# Patient Record
Sex: MALE | NOT HISPANIC OR LATINO | URBAN - METROPOLITAN AREA
[De-identification: names, ages, dates, MRNs, and addresses within clinical notes are randomized per-mention and may not be internally consistent; named-entity substitution may affect disease eponyms.]

---

## 2019-01-14 ENCOUNTER — OFFICE VISIT (OUTPATIENT)
Dept: URBAN - METROPOLITAN AREA CLINIC 33 | Facility: CLINIC | Age: 64
End: 2019-01-14
Payer: COMMERCIAL

## 2019-01-14 PROCEDURE — 92134 CPTRZ OPH DX IMG PST SGM RTA: CPT | Performed by: OPHTHALMOLOGY

## 2019-01-14 PROCEDURE — 99213 OFFICE O/P EST LOW 20 MIN: CPT | Performed by: OPHTHALMOLOGY

## 2019-01-14 ASSESSMENT — INTRAOCULAR PRESSURE
OD: 18
OS: 17

## 2019-01-14 NOTE — IMPRESSION/PLAN
Impression: Type 2 diab w severe nonprlf diabetic rtnop w macular edema, bilateral: e11.3413. OU. Condition: unstable. Vision: vision affected. S/P AV OU 5/31/18
s/p AV OS 06/19/2017 and previously AV OU 07/28/2016
s/p Map OD 09/14/2016, s/p Map OS 09/28/2016. Plan: Discussed diagnosis in detail with patient. Discussed risks of progression. Recommend Focal Laser treatment, MAP OD then MAP OS to help reduce the swelling and prevent a further reduction in vision. Discussed risks/benefits of laser TX. All questions answered. OCT performed today: slight decreased CMT OU Patient understands that additional KENNETH treatments or laser treatments may be needed in the future.

## 2019-01-14 NOTE — IMPRESSION/PLAN
Impression: Age-related nuclear cataract, bilateral: H25.13. OU. Condition: stable. Vision: vision affected. Plan: Cataracts account for the patient's complaints. No treatment currently recommended. The patient will monitor vision changes and contact us with any decrease in vision.

## 2019-01-30 ENCOUNTER — SURGERY (OUTPATIENT)
Dept: URBAN - METROPOLITAN AREA SURGERY 15 | Facility: SURGERY | Age: 64
End: 2019-01-30
Payer: COMMERCIAL

## 2019-01-30 PROCEDURE — 67210 TREATMENT OF RETINAL LESION: CPT | Performed by: OPHTHALMOLOGY

## 2019-02-13 ENCOUNTER — SURGERY (OUTPATIENT)
Dept: URBAN - METROPOLITAN AREA SURGERY 15 | Facility: SURGERY | Age: 64
End: 2019-02-13
Payer: COMMERCIAL

## 2019-02-13 PROCEDURE — 67210 TREATMENT OF RETINAL LESION: CPT | Performed by: OPHTHALMOLOGY

## 2019-03-07 ENCOUNTER — OFFICE VISIT (OUTPATIENT)
Dept: URBAN - METROPOLITAN AREA CLINIC 33 | Facility: CLINIC | Age: 64
End: 2019-03-07
Payer: COMMERCIAL

## 2019-03-07 PROCEDURE — 99213 OFFICE O/P EST LOW 20 MIN: CPT | Performed by: OPTOMETRIST

## 2019-03-07 PROCEDURE — 92134 CPTRZ OPH DX IMG PST SGM RTA: CPT | Performed by: OPTOMETRIST

## 2019-03-07 ASSESSMENT — KERATOMETRY
OD: 45.63
OS: 45.13

## 2019-03-07 ASSESSMENT — INTRAOCULAR PRESSURE
OS: 16
OD: 16

## 2019-03-07 NOTE — IMPRESSION/PLAN
Impression: Type 2 diab w moderate nonprlf diab rtnop w macular edema, bilateral: E21.9330. Plan: Diabetes type II: moderate nonproliferative diabetic retinopathy, no signs of neovascularization noted. No treatment necessary at this time. Patient was instructed to monitor vision for sudden changes and to call if visual changes occur. Discussed ocular and systemic benefits of maintaining blood sugar control. Letter sent to communicate findings to PCP. Clinically Signficant Macular Edema noted today. Educated patient about today's findings and the need for treatment. Will refer patient for a retinal consult to determine treatment plan. Discussed ocular and systemic benefits of maintaining blood sugar control. Patient understands the potential loss of visual acuity from diabetic macular edema. Order OCT-mac OU.

## 2019-03-21 ENCOUNTER — POST-OPERATIVE VISIT (OUTPATIENT)
Dept: URBAN - METROPOLITAN AREA CLINIC 33 | Facility: CLINIC | Age: 64
End: 2019-03-21
Payer: COMMERCIAL

## 2019-03-21 PROCEDURE — 99024 POSTOP FOLLOW-UP VISIT: CPT | Performed by: OPHTHALMOLOGY

## 2019-03-21 PROCEDURE — 67028 INJECTION EYE DRUG: CPT | Performed by: OPHTHALMOLOGY

## 2019-03-21 ASSESSMENT — INTRAOCULAR PRESSURE
OD: 15
OS: 14

## 2019-04-22 ENCOUNTER — POST-OPERATIVE VISIT (OUTPATIENT)
Dept: URBAN - METROPOLITAN AREA CLINIC 33 | Facility: CLINIC | Age: 64
End: 2019-04-22
Payer: COMMERCIAL

## 2019-04-22 DIAGNOSIS — Z09 ENCNTR FOR F/U EXAM AFT TRTMT FOR COND OTH THAN MALIG NEOPLM: Primary | ICD-10-CM

## 2019-04-22 PROCEDURE — 99024 POSTOP FOLLOW-UP VISIT: CPT | Performed by: OPHTHALMOLOGY

## 2019-04-22 ASSESSMENT — INTRAOCULAR PRESSURE
OS: 15
OD: 15

## 2019-07-15 ENCOUNTER — OFFICE VISIT (OUTPATIENT)
Dept: URBAN - METROPOLITAN AREA CLINIC 33 | Facility: CLINIC | Age: 64
End: 2019-07-15
Payer: COMMERCIAL

## 2019-07-15 DIAGNOSIS — H25.813 COMBINED FORMS OF AGE-RELATED CATARACT, BILATERAL: ICD-10-CM

## 2019-07-15 PROCEDURE — 99213 OFFICE O/P EST LOW 20 MIN: CPT | Performed by: OPHTHALMOLOGY

## 2019-07-15 PROCEDURE — 92134 CPTRZ OPH DX IMG PST SGM RTA: CPT | Performed by: OPHTHALMOLOGY

## 2019-07-15 ASSESSMENT — INTRAOCULAR PRESSURE
OS: 13
OD: 14

## 2019-07-15 NOTE — IMPRESSION/PLAN
Impression: Type 2 diab w severe nonprlf diabetic rtnop w macular edema, bilateral: e11.3413. OU. Condition: stable OD, unstable OS. Vision: vision affected. s/p MAP OD 1/30/2019, s/p MAP OS 2/13/2019
s/p AV OS 3/21/2019, S/P AV OU 5/31/18, s/p AV OS 06/19/2017 and previously AV OU 07/28/2016
s/p Map OD 09/14/2016, s/p Map OS 09/28/2016. Plan: Exam and OCT of the left eye today show increased edema. Discussed diagnosis in detail with patient. Discussed risks of progression. Based on today's exam, diagnostic studies and review of records, recommend to continue with KENNETH tx in order to help reduce the swelling and prevent a further reduction in vision. Discussed the risks and benefits of tx. Patient elects to proceed with recommendation. Patient may need additional KENNETH tx or laser treatment in the future. Exam and OCT of the right eye shows no active edema, stable macula. No treatment is required. Recommend close observation for the right eye and will reassess in 4-6 wks.

## 2019-07-15 NOTE — IMPRESSION/PLAN
Impression: Vitreous degeneration, bilateral: H43.813. OU. Condition: stable. Vision: vision not affected. Plan: Discussed diagnosis in detail with patient. Explained exam shows no evidence of retinal pathology. Discussed signs and symptoms of PVD/floaters. Discussed signs and symptoms of retinal detachment. No treatment is required at this time. Will continue to observe condition and or symptoms. Come in ASAP if there is a change or decrease in vision.

## 2019-07-15 NOTE — IMPRESSION/PLAN
Impression: Combined forms of age-related cataract, bilateral: H25.813. OU. Condition: stable. Plan: Cataracts account for the patient's complaints. No treatment currently recommended. The patient will monitor vision changes and contact us with any decrease in vision.

## 2019-07-29 ENCOUNTER — PROCEDURE (OUTPATIENT)
Dept: URBAN - METROPOLITAN AREA CLINIC 33 | Facility: CLINIC | Age: 64
End: 2019-07-29
Payer: COMMERCIAL

## 2019-07-29 DIAGNOSIS — E11.3412 TYPE 2 DIAB WITH SEVERE NONP RTNOP WITH MACULAR EDEMA, L EYE: Primary | ICD-10-CM

## 2019-07-29 PROCEDURE — 67028 INJECTION EYE DRUG: CPT | Performed by: OPHTHALMOLOGY

## 2019-09-19 ENCOUNTER — OFFICE VISIT (OUTPATIENT)
Dept: URBAN - METROPOLITAN AREA CLINIC 33 | Facility: CLINIC | Age: 64
End: 2019-09-19
Payer: COMMERCIAL

## 2019-09-19 PROCEDURE — 92134 CPTRZ OPH DX IMG PST SGM RTA: CPT | Performed by: OPHTHALMOLOGY

## 2019-09-19 PROCEDURE — 99213 OFFICE O/P EST LOW 20 MIN: CPT | Performed by: OPHTHALMOLOGY

## 2019-09-19 ASSESSMENT — INTRAOCULAR PRESSURE
OS: 14
OD: 14

## 2019-09-19 NOTE — IMPRESSION/PLAN
Impression: Type 2 diab w severe nonprlf diabetic rtnop w macular edema, bilateral: e11.3413. OU. Condition:  unstable OU. Vision: vision affected. s/p MAP OD 1/30/2019, s/p Map OD 09/14/2016, 
s/p AV OS 07/29/19,  s/p MAP OS 2/13/2019 S/P AV OU 5/31/18 Plan: Discussed diagnosis in detail with patient. Discussed risks of progression. Based on today's exam, diagnostic studies and review of records, recommend to continue with KENNETH tx in BOTH eyes to help reduce the fluid in order to prevent a further reduction in vision. An examination that was significantly and separately identifiable from the procedure was performed today. Discussed the risks and benefits of tx. Patient elects to proceed with recommendation. OCT shows mild edema OD, marked reduction in edema OS.

## 2019-09-23 ENCOUNTER — OFFICE VISIT (OUTPATIENT)
Dept: URBAN - METROPOLITAN AREA CLINIC 32 | Facility: CLINIC | Age: 64
End: 2019-09-23
Payer: COMMERCIAL

## 2019-09-23 PROCEDURE — 99213 OFFICE O/P EST LOW 20 MIN: CPT | Performed by: OPHTHALMOLOGY

## 2019-09-23 PROCEDURE — 92134 CPTRZ OPH DX IMG PST SGM RTA: CPT | Performed by: OPHTHALMOLOGY

## 2019-09-23 ASSESSMENT — INTRAOCULAR PRESSURE
OS: 17
OD: 18

## 2019-09-23 NOTE — IMPRESSION/PLAN
Impression: Type 2 diab w severe nonprlf diabetic rtnop w macular edema, bilateral: e11.3413. OU. Condition:  unstable OU. Vision: vision affected. s/p AV OU 9/19/2019, s/p AV OS 07/29/19,  s/p MAP OS 2/13/2019, s/p MAP OD 1/30/2019, S/P AV OU 5/31/18, s/p Map OD 09/14/2016, Plan: Exam and OCT today of the right eye shows decreasing edema. The exam and OCT of the left eye shows decreasing edema, hazy view ? slight hemorrhage, neg cell neg flare no signs of infection. Advised patient of condition. Discussed diagnosis in detail with patient. Recommend patient return as scheduled in 4-6 wks for DE/OCT possible injection.  Call the office if any vision changes are experienced

## 2020-01-13 ENCOUNTER — OFFICE VISIT (OUTPATIENT)
Dept: URBAN - METROPOLITAN AREA CLINIC 33 | Facility: CLINIC | Age: 65
End: 2020-01-13
Payer: COMMERCIAL

## 2020-01-13 PROCEDURE — 92134 CPTRZ OPH DX IMG PST SGM RTA: CPT | Performed by: OPHTHALMOLOGY

## 2020-01-13 PROCEDURE — 67028 INJECTION EYE DRUG: CPT | Performed by: OPHTHALMOLOGY

## 2020-01-13 PROCEDURE — 99213 OFFICE O/P EST LOW 20 MIN: CPT | Performed by: OPHTHALMOLOGY

## 2020-01-13 ASSESSMENT — INTRAOCULAR PRESSURE
OS: 14
OD: 12

## 2020-01-13 NOTE — IMPRESSION/PLAN
Impression: Type 2 diab w severe nonprlf diabetic rtnop w macular edema, bilateral: e11.3413. OU. Condition:  improved OD and unstable OS. Vision: vision affected. s/p AV OU 9/19/2019, s/p AV OS 07/29/19,  s/p MAP OS 2/13/2019, s/p MAP OD 1/30/2019, S/P AV OU 5/31/18, s/p Map OD 09/14/2016, Plan: Exam and OCT of the left eye shows increased edema. Discussed diagnosis in detail with patient. Discussed risks of progression. Based on today's exam, diagnostic studies and review of records, recommend to continue with KENNETH tx AVASTIN LEFT EYE ONLY in order to help reduce the swelling and prevent a further reduction in vision. An examination that was significantly and separately identifiable from the procedure was performed today. Discussed the risks and benefits of tx. Patient elects to proceed with recommendation. Exam and OCT of the right eye shows no active edema today. No treatment is required. Recommend close observation, will reassess in 4-5 wks. Patient may need additional KENNETH tx or laser treatment in the future.

## 2020-04-30 ENCOUNTER — OFFICE VISIT (OUTPATIENT)
Dept: URBAN - METROPOLITAN AREA CLINIC 33 | Facility: CLINIC | Age: 65
End: 2020-04-30
Payer: COMMERCIAL

## 2020-04-30 DIAGNOSIS — H43.813 VITREOUS DEGENERATION, BILATERAL: ICD-10-CM

## 2020-04-30 DIAGNOSIS — E11.3313 TYPE 2 DIAB WITH MODERATE NONP RTNOP WITH MACULAR EDEMA, BI: ICD-10-CM

## 2020-04-30 PROCEDURE — 99213 OFFICE O/P EST LOW 20 MIN: CPT | Performed by: OPHTHALMOLOGY

## 2020-04-30 PROCEDURE — 92134 CPTRZ OPH DX IMG PST SGM RTA: CPT | Performed by: OPHTHALMOLOGY

## 2020-04-30 ASSESSMENT — INTRAOCULAR PRESSURE
OD: 14
OS: 14

## 2020-04-30 NOTE — IMPRESSION/PLAN
Impression: Vitreous degeneration, bilateral: H43.813. OU. Condition: stable. Plan: Discussed diagnosis in detail with patient. Discussed signs and symptoms of PVD/floaters. Discussed signs and symptoms of retinal detachment. No treatment is required at this time. Will continue to observe condition and or symptoms. Come in ASAP if there is a change or decrease in vision.

## 2020-04-30 NOTE — IMPRESSION/PLAN
Impression: Type 2 diab w severe nonprlf diabetic rtnop w macular edema, bilateral: e11.3413. OU. Condition:  unstable OU. Vision: vision affected. s/p AV OS 1/13/2020, AV OU 9/19/2019, s/p AV OS 07/29/19,  s/p MAP OS 2/13/2019, s/p MAP OD 1/30/2019, S/P AV OU 5/31/18, s/p Map OD 09/14/2016, Plan: Discussed diagnosis in detail with patient. Discussed risks of progression. Based on today's exam, diagnostic studies and review of records, recommend to continue with KENNETH tx AV OU in order to help reduce the swelling and prevent a further reduction in vision. An examination that was significantly and separately identifiable from the procedure was performed today. Discussed the risks and benefits of tx. Patient elects to proceed with recommendation. OCT shows mild edema OD and slight increase edema OS Patient may need additional KENNETH tx or laser treatment in the future.

## 2020-06-11 ENCOUNTER — OFFICE VISIT (OUTPATIENT)
Dept: URBAN - METROPOLITAN AREA CLINIC 33 | Facility: CLINIC | Age: 65
End: 2020-06-11
Payer: COMMERCIAL

## 2020-06-11 PROCEDURE — 92134 CPTRZ OPH DX IMG PST SGM RTA: CPT | Performed by: OPHTHALMOLOGY

## 2020-06-11 PROCEDURE — 99213 OFFICE O/P EST LOW 20 MIN: CPT | Performed by: OPHTHALMOLOGY

## 2020-06-11 ASSESSMENT — INTRAOCULAR PRESSURE
OD: 14
OS: 13

## 2020-06-11 NOTE — IMPRESSION/PLAN
Impression: Type 2 diab w severe nonprlf diabetic rtnop w macular edema, bilateral: e11.3413. OU. Condition:  unstable, improving OU. Vision: vision affected. s/p AV OU 4/30/2020, AV OS 1/13/2020, AV OU 9/19/2019, s/p AV OS 07/29/19,  s/p MAP OS 2/13/2019, s/p MAP OD 1/30/2019, S/P AV OU 5/31/18, s/p Map OD 09/14/2016, Plan: Discussed diagnosis in detail with patient. Discussed risks of progression. Based on today's exam, diagnostic studies and review of records, recommend to continue with KENNETH tx AV OU in order to help reduce the swelling and prevent a further reduction in vision. An examination that was significantly and separately identifiable from the procedure was performed today. Discussed the risks and benefits of tx. Patient elects to proceed with recommendation. OCT shows decreased edema OD and significant reduction edema OS Patient may need additional KENNETH tx or laser treatment in the future.

## 2020-08-24 ENCOUNTER — PROCEDURE (OUTPATIENT)
Dept: URBAN - METROPOLITAN AREA CLINIC 33 | Facility: CLINIC | Age: 65
End: 2020-08-24
Payer: COMMERCIAL

## 2020-08-24 PROCEDURE — 67028 INJECTION EYE DRUG: CPT | Performed by: OPHTHALMOLOGY

## 2020-08-24 PROCEDURE — 92134 CPTRZ OPH DX IMG PST SGM RTA: CPT | Performed by: OPHTHALMOLOGY

## 2020-08-24 PROCEDURE — 99213 OFFICE O/P EST LOW 20 MIN: CPT | Performed by: OPHTHALMOLOGY

## 2020-08-24 ASSESSMENT — INTRAOCULAR PRESSURE
OD: 16
OS: 15

## 2020-08-24 NOTE — IMPRESSION/PLAN
Impression: Type 2 diab w severe nonprlf diabetic rtnop w macular edema, bilateral: e11.3413. OU. Condition:  unstable, OU. Vision: vision affected. s/p AV OU 6/11/2020, AV OU 4/30/2020, AV OS 1/13/2020, AV OU 9/19/2019, s/p AV OS 07/29/19,  s/p MAP OS 2/13/2019, s/p MAP OD 1/30/2019, S/P AV OU 5/31/18, s/p Map OD 09/14/2016, Plan: Due to Coronavirus COVID-19 pandemic and National Emergency, deferred Slit Lamp examination. Findings are based on OCT. OCT shows and Optos shows increased edema OU. Based on findings recommend to continue with Intravitreal Injection Treatment AV OU to help reduce the swelling and prevent a further reduction in vision,  if not substantially improved in 4 weeks will consider adding Kenalog OU with Avastin OU. Discussed the risks and benefits of tx. All questions answered. Patient elects to proceed with recommendation.

## 2020-08-24 NOTE — ASSESSMENT/PLAN
Impression: Diagnostic Test - OCT MAC: Good-increased edema Plan: recommend AV OU today if not substantially improved in 4 weeks will consider adding Kenalog OU with Avastin OU

## 2020-09-21 ENCOUNTER — OFFICE VISIT (OUTPATIENT)
Dept: URBAN - METROPOLITAN AREA CLINIC 33 | Facility: CLINIC | Age: 65
End: 2020-09-21
Payer: COMMERCIAL

## 2020-09-21 PROCEDURE — 92134 CPTRZ OPH DX IMG PST SGM RTA: CPT | Performed by: OPHTHALMOLOGY

## 2020-09-21 PROCEDURE — 99213 OFFICE O/P EST LOW 20 MIN: CPT | Performed by: OPHTHALMOLOGY

## 2020-09-21 ASSESSMENT — INTRAOCULAR PRESSURE
OS: 14
OD: 15

## 2020-09-21 NOTE — IMPRESSION/PLAN
Impression: Type 2 diab w severe nonprlf diabetic rtnop w macular edema, bilateral: e11.3413. OU. Condition: improving, OU. Vision: vision improved. s/p AV OU 08/24/2020, AV OU 6/11/2020, AV OU 4/30/2020, AV OS 1/13/2020 . .. s/p MAP OS 2/13/2019, s/p MAP OD 1/30/2019, s/p Map OD 09/14/2016, Plan: Due to Coronavirus COVID-19 pandemic and National Emergency, deferred Slit Lamp examination. Findings are based on OCT. OCT shows  a marked decrease in edema OU post KENNETH tx OU 08/24/2020. No treatment is require at this time. Recommend a retina follow - up in 1 mos.

## 2020-10-15 ENCOUNTER — OFFICE VISIT (OUTPATIENT)
Dept: URBAN - METROPOLITAN AREA CLINIC 33 | Facility: CLINIC | Age: 65
End: 2020-10-15
Payer: COMMERCIAL

## 2020-10-15 PROCEDURE — 92134 CPTRZ OPH DX IMG PST SGM RTA: CPT | Performed by: OPHTHALMOLOGY

## 2020-10-15 PROCEDURE — 99213 OFFICE O/P EST LOW 20 MIN: CPT | Performed by: OPHTHALMOLOGY

## 2020-10-15 ASSESSMENT — INTRAOCULAR PRESSURE
OS: 17
OD: 15

## 2020-10-15 NOTE — IMPRESSION/PLAN
Impression: Type 2 diab w severe nonprlf diabetic rtnop w macular edema, bilateral: e11.3413. OU. Condition: unstable, OU. Vision: vision worsening.
s/p AV OU 08/24/2020, AV OU 6/11/2020, AV OU 4/30/2020, AV OS 1/13/2020 . .. s/p MAP OS 2/13/2019, s/p MAP OD 1/30/2019, s/p Map OD 09/14/2016, Plan: Due to Coronavirus COVID-19 pandemic and National Emergency, deferred Slit Lamp examination. Findings are based on OCT. OCT shows increase CSDME OU. Based on findings recommend to continue with Intravitreal Injection Treatment AV OU and add the steroid injection Kenalog OU to help further reduce the swelling and prevent a further reduction in vision. Discussed the risks and benefits of tx. All questions answered. Patient elects to proceed with recommendation.

## 2020-11-30 ENCOUNTER — OFFICE VISIT (OUTPATIENT)
Dept: URBAN - METROPOLITAN AREA CLINIC 32 | Facility: CLINIC | Age: 65
End: 2020-11-30
Payer: COMMERCIAL

## 2020-11-30 PROCEDURE — 92134 CPTRZ OPH DX IMG PST SGM RTA: CPT | Performed by: OPHTHALMOLOGY

## 2020-11-30 PROCEDURE — 99213 OFFICE O/P EST LOW 20 MIN: CPT | Performed by: OPHTHALMOLOGY

## 2020-11-30 ASSESSMENT — INTRAOCULAR PRESSURE
OS: 15
OD: 15

## 2020-11-30 NOTE — IMPRESSION/PLAN
Impression: Type 2 diab w severe nonprlf diabetic rtnop w macular edema, bilateral: e11.3413. OU. Condition: improving OU. Vision: vision affected. s/p AV / [place] OU 10/15/2020, AV OU 08/24/2020, AV OU 6/11/2020, AV OU 4/30/2020, AV OS 1/13/2020 . .. s/p MAP OS 2/13/2019, s/p MAP OD 1/30/2019, s/p Map OD 09/14/2016, Plan: Discussed diagnosis in detail with patient. Exam shows a decrease in edema and OCT shows a decrease in edema post KENNETH tx with AVASTIN and KENALOG OU. No treatment is required at this time. Recommend a retina follow - up in 6 wks. OK to proceed with a refraction (may need to fill out a form for [employer]).

## 2021-01-07 ENCOUNTER — OFFICE VISIT (OUTPATIENT)
Dept: URBAN - METROPOLITAN AREA CLINIC 33 | Facility: CLINIC | Age: 66
End: 2021-01-07
Payer: COMMERCIAL

## 2021-01-07 DIAGNOSIS — H35.373 PUCKERING OF MACULA, BILATERAL: ICD-10-CM

## 2021-01-07 DIAGNOSIS — H25.13 AGE-RELATED NUCLEAR CATARACT, BILATERAL: ICD-10-CM

## 2021-01-07 PROCEDURE — 99213 OFFICE O/P EST LOW 20 MIN: CPT | Performed by: OPHTHALMOLOGY

## 2021-01-07 PROCEDURE — 92134 CPTRZ OPH DX IMG PST SGM RTA: CPT | Performed by: OPHTHALMOLOGY

## 2021-01-07 ASSESSMENT — INTRAOCULAR PRESSURE
OS: 15
OD: 15

## 2021-01-07 NOTE — IMPRESSION/PLAN
Impression: Puckering of macula, bilateral: H35.373. Bilateral. Condition: new prob, no addtl w/u needed. Plan: Discussed diagnosis in detail with patient. No treatment is required at this time. Will continue to observe condition and or symptoms. OCT shows ILM / ERM change with increase in edema OU. Recommend KENNETH tx OU - see notes above.

## 2021-01-07 NOTE — IMPRESSION/PLAN
Impression: Type 2 diab w severe nonprlf diabetic rtnop w macular edema, bilateral: e11.3413. OU. Condition: unstable OU. Vision: vision affected. s/p AV / DANI OU 10/15/2020, AV OU 08/24/2020, AV OU 6/11/2020, AV OU 4/30/2020 . .. s/p MAP OS 2/13/2019, s/p MAP OD 1/30/2019, s/p Map OD 09/14/2016, Plan: Due to Coronavirus COVID-19 pandemic and National Emergency, deferred Slit Lamp examination. Findings are based on OCT and Optos. OCT and Optos shows an increase in edema OU with mild ILM / ERM change OU. Based on findings recommend to continue with Intravitreal Injection Treatment BOTH EYES with AVASTIN to help reduce the swelling and prevent a further reduction in vision. Discussed the risks and benefits of tx. All questions answered. Patient elects to proceed with recommendation.

## 2021-01-07 NOTE — IMPRESSION/PLAN
Impression: Age-related nuclear cataract, bilateral: H25.13. Bilateral. Condition: stable. Plan: Discussed diagnosis in detail with patient. No treatment is required at this time. Will continue to observe condition and or symptoms.

## 2021-01-12 ENCOUNTER — OFFICE VISIT (OUTPATIENT)
Dept: URBAN - METROPOLITAN AREA CLINIC 32 | Facility: CLINIC | Age: 66
End: 2021-01-12
Payer: COMMERCIAL

## 2021-01-12 DIAGNOSIS — H52.4 PRESBYOPIA: ICD-10-CM

## 2021-01-12 PROCEDURE — 99214 OFFICE O/P EST MOD 30 MIN: CPT | Performed by: OPTOMETRIST

## 2021-01-12 ASSESSMENT — VISUAL ACUITY
OS: 20/50
OD: 20/50

## 2021-01-12 ASSESSMENT — KERATOMETRY
OS: 44.75
OD: 44.88

## 2021-01-12 NOTE — IMPRESSION/PLAN
Impression: Puckering of macula, bilateral: H35.373. Bilateral. Condition: new prob, no addtl w/u needed. Plan: Discussed diagnosis in detail with patient. No treatment is required at this time. Will continue to observe condition and or symptoms.  cont with retina

## 2021-02-18 ENCOUNTER — OFFICE VISIT (OUTPATIENT)
Dept: URBAN - METROPOLITAN AREA CLINIC 33 | Facility: CLINIC | Age: 66
End: 2021-02-18
Payer: COMMERCIAL

## 2021-02-18 PROCEDURE — 92134 CPTRZ OPH DX IMG PST SGM RTA: CPT | Performed by: OPHTHALMOLOGY

## 2021-02-18 PROCEDURE — 92012 INTRM OPH EXAM EST PATIENT: CPT | Performed by: OPHTHALMOLOGY

## 2021-02-18 ASSESSMENT — INTRAOCULAR PRESSURE
OS: 17
OD: 15

## 2021-02-18 NOTE — IMPRESSION/PLAN
Impression: Age-related nuclear cataract, bilateral: H25.13.OU Condition: stable. Vision: vision not affected. Plan: Discussed diagnosis in detail with patient. No treatment is required at this time. Discussed with patient that his cataracts are actually very small at this time and surgery is not recommended, because of his retinal history with the diabetic retinopathy he is more susceptible to increase swelling in the retina after cataract surgery. Recommend to continue to observe and wait until the retina is more stable and the cataracts are worse before pursing cataract surgery.

## 2021-02-18 NOTE — IMPRESSION/PLAN
Impression: Type 2 diab w severe nonprlf diabetic rtnop w macular edema, bilateral: e11.3413. OU. Condition: stabilizing OU. Vision: vision affected. s/p AV OU 1/7/2021, AV / [place] OU 10/15/2020, AV OU 08/24/2020, AV OU 6/11/2020, AV OU 4/30/2020 . .. s/p MAP OS 2/13/2019, s/p MAP OD 1/30/2019, s/p Map OD 09/14/2016, Plan: Discussed diagnosis in detail with patient. Exam shows minimal edema, mild ERM, prev focal laser tx. No treatment is recommended at this time. Emphasized blood sugar control and advised to keep future appointments with PCP and/or Endocrinologist for the management of Diabetes. Recommend observation for now. Will reassess in 6 weeks. OCT shows minimal edema, ERM significant reduction OU. Patient states he was recently taken off of his insulin since his visit on 1/7/2021 because his A1C was in a good range.

## 2021-04-05 ENCOUNTER — OFFICE VISIT (OUTPATIENT)
Dept: URBAN - METROPOLITAN AREA CLINIC 33 | Facility: CLINIC | Age: 66
End: 2021-04-05
Payer: COMMERCIAL

## 2021-04-05 PROCEDURE — 92134 CPTRZ OPH DX IMG PST SGM RTA: CPT | Performed by: OPHTHALMOLOGY

## 2021-04-05 ASSESSMENT — INTRAOCULAR PRESSURE
OD: 16
OS: 17

## 2021-04-05 NOTE — IMPRESSION/PLAN
Impression: Type 2 diab w severe nonprlf diabetic rtnop w macular edema, bilateral: e11.3413. OU. Condition: unstable OU. Vision: vision affected. s/p AV OU 1/7/2021, AV / [place]bigniew Sl[place] OU 10/15/2020, AV OU 08/24/2020, AV OU 6/11/2020, AV OU 4/30/2020 . .. s/p MAP OS 2/13/2019, s/p MAP OD 1/30/2019, s/p Map OD 09/14/2016, Plan: Discussed diagnosis in detail with patient. Exam shows minimal edema OD and moderate edema OS. Based on findings recommend Intravitreal Injection treatment BOTH EYES with AVASTIN to help reduce the swelling and prevent a further reduction in vision. Discussed the risks and benefits of tx. All questions answered. Patient elects to proceed with recommendation. Emphasized blood sugar control and advised to keep future appointments with PCP and/or Endocrinologist for the management of Diabetes. OCT OD shows mild edema and OS shows moderate edema w/ILM change.

## 2021-05-27 ENCOUNTER — OFFICE VISIT (OUTPATIENT)
Dept: URBAN - METROPOLITAN AREA CLINIC 33 | Facility: CLINIC | Age: 66
End: 2021-05-27
Payer: COMMERCIAL

## 2021-05-27 PROCEDURE — 92012 INTRM OPH EXAM EST PATIENT: CPT | Performed by: OPHTHALMOLOGY

## 2021-05-27 PROCEDURE — 92134 CPTRZ OPH DX IMG PST SGM RTA: CPT | Performed by: OPHTHALMOLOGY

## 2021-05-27 ASSESSMENT — INTRAOCULAR PRESSURE
OD: 15
OS: 15

## 2021-05-27 NOTE — IMPRESSION/PLAN
Impression: Type 2 diab w severe nonprlf diabetic rtnop w macular edema, bilateral: e11.3413. OU. Condition: improving OU. Vision: vision affected. s/p AV OU 04/05/2021, AV OU 1/7/2021, AV / [place] OU 10/15/2020, AV OU 08/24/2020 . .. s/p MAP OS 2/13/2019, s/p MAP OD 1/30/2019, s/p Map OD 09/14/2016, Plan: Due to Coronavirus COVID-19 pandemic and National Emergency, deferred Slit Lamp examination. Findings are based on diagnostic test, OCT shows a decrease in edema OU. Based on findings recommend to continue with Intravitreal Injection Treatment BOTH EYES with AVASTIN to further reduce the swelling and prevent a further reduction in vision. Discussed the risks and benefits of tx. All questions answered. Patient elects to proceed with recommendation.

## 2021-07-22 ENCOUNTER — OFFICE VISIT (OUTPATIENT)
Dept: URBAN - METROPOLITAN AREA CLINIC 33 | Facility: CLINIC | Age: 66
End: 2021-07-22
Payer: COMMERCIAL

## 2021-07-22 DIAGNOSIS — E11.3413 TYPE 2 DIAB W SEVERE NONPRLF DIABETIC RTNOP W MACULAR EDEMA, BILATERAL: Primary | ICD-10-CM

## 2021-07-22 PROCEDURE — 92134 CPTRZ OPH DX IMG PST SGM RTA: CPT | Performed by: OPHTHALMOLOGY

## 2021-07-22 ASSESSMENT — INTRAOCULAR PRESSURE
OD: 15
OS: 16

## 2021-07-22 NOTE — IMPRESSION/PLAN
Impression: Type 2 diab w severe nonprlf diabetic rtnop w macular edema, bilateral: e11.3413. OU. Condition: unstable OU. Vision: vision affected. s/p AV OU 04/05/2021, AV OU 1/7/2021, AV / [employer] [place] OU 10/15/2020, AV OU 08/24/2020 . .. s/p MAP OS 2/13/2019, s/p MAP OD 1/30/2019, s/p Map OD 09/14/2016, Plan: Discussed diagnosis in detail with patient. Discussed risks of progression. Based on today's exam, diagnostic study and review of records, recommend Intravitreal Injection Treatment in BOTH EYES with AVASTIN in order to help reduce the swelling / leakage and prevent a further reduction in vision. Discussed the risks and benefits of tx. All questions answered. Patient elects to proceed with recommendation. OCT shows minimal leakage OU. Patient may need additional KENNETH tx or laser treatment in the future.

## 2021-09-02 ENCOUNTER — OFFICE VISIT (OUTPATIENT)
Dept: URBAN - METROPOLITAN AREA CLINIC 33 | Facility: CLINIC | Age: 66
End: 2021-09-02
Payer: COMMERCIAL

## 2021-09-02 PROCEDURE — 92014 COMPRE OPH EXAM EST PT 1/>: CPT | Performed by: OPHTHALMOLOGY

## 2021-09-02 PROCEDURE — 92134 CPTRZ OPH DX IMG PST SGM RTA: CPT | Performed by: OPHTHALMOLOGY

## 2021-09-02 ASSESSMENT — INTRAOCULAR PRESSURE
OS: 16
OD: 15

## 2021-09-02 NOTE — IMPRESSION/PLAN
Impression: Type 2 diab w severe nonprlf diabetic rtnop w macular edema, bilateral: e11.3413. OU. Condition: improving OU. Vision: vision affected. s/p AV OU 7/22/2021, AV OU 04/05/2021, AV OU 1/7/2021, AV / Damon Prier OU 10/15/2020, AV OU 08/24/2020 . .. s/p MAP OS 2/13/2019, s/p MAP OD 1/30/2019, s/p Map OD 09/14/2016, Plan: Discussed diagnosis in detail with patient. Exam shows mild exudates and mild edema OD, mild edema OS. OCT shows a definite decrease in leakage OU. Discussed risks of progression. Recommend Macular Photocoagulation Laser treatment MAP RIGHT EYE then MAP LEFT EYE to help reduce the swelling and prevent a further reduction in vision. Discussed risks/benefits of laser TX. All questions answered. RL1 Patient understands that additional KENNETH treatments or laser treatments may be needed in the future.

## 2021-09-02 NOTE — IMPRESSION/PLAN
Impression: Age-related nuclear cataract, bilateral: H25.13. Bilateral. Condition: established, worsening. Vision: vision affected. Plan: Cataracts account for the patient's complaints. No treatment currently recommended until the retina becomes stable OU. Advised patient that exam shows NS 1-2+ which could be removed and lead to an improvement in vision. The patient will monitor vision changes and contact us with any decrease in vision. Advised patient we will reassess the condition of his retina at his next appt and decide how to proceed.

## 2021-09-22 ENCOUNTER — SURGERY (OUTPATIENT)
Dept: URBAN - METROPOLITAN AREA SURGERY 15 | Facility: SURGERY | Age: 66
End: 2021-09-22
Payer: COMMERCIAL

## 2021-09-22 PROCEDURE — 67210 TREATMENT OF RETINAL LESION: CPT | Performed by: OPHTHALMOLOGY

## 2021-10-01 ENCOUNTER — POST-OPERATIVE VISIT (OUTPATIENT)
Dept: URBAN - METROPOLITAN AREA CLINIC 33 | Facility: CLINIC | Age: 66
End: 2021-10-01

## 2021-10-01 PROCEDURE — 99024 POSTOP FOLLOW-UP VISIT: CPT | Performed by: OPTOMETRIST

## 2021-10-01 ASSESSMENT — INTRAOCULAR PRESSURE
OS: 15
OD: 12

## 2021-10-01 NOTE — IMPRESSION/PLAN
Impression: S/P MAP (Photocoagulation for Maculopathy laser) OD - 9 Days. Encounter for surgical aftercare following surgery on a sense organ  Z48.810. Discussed condition will continue to improve. Discussed swelling can take up to 6 weeks to resolve. Keep all scheduled appointments. Plan: --Advised patient to use artificial tears for comfort.

## 2021-11-03 ENCOUNTER — SURGERY (OUTPATIENT)
Dept: URBAN - METROPOLITAN AREA SURGERY 15 | Facility: SURGERY | Age: 66
End: 2021-11-03
Payer: COMMERCIAL

## 2021-11-03 PROCEDURE — 67210 TREATMENT OF RETINAL LESION: CPT | Performed by: OPHTHALMOLOGY

## 2021-11-11 ENCOUNTER — POST-OPERATIVE VISIT (OUTPATIENT)
Dept: URBAN - METROPOLITAN AREA CLINIC 33 | Facility: CLINIC | Age: 66
End: 2021-11-11

## 2021-11-11 DIAGNOSIS — Z48.810 ENCOUNTER FOR SURGICAL AFTERCARE FOLLOWING SURGERY ON A SENSE ORGAN: Primary | ICD-10-CM

## 2021-11-11 PROCEDURE — 99024 POSTOP FOLLOW-UP VISIT: CPT | Performed by: OPHTHALMOLOGY

## 2021-11-11 ASSESSMENT — INTRAOCULAR PRESSURE
OD: 16
OS: 16

## 2021-11-11 NOTE — IMPRESSION/PLAN
Impression: S/P MAP (Photocoagulation for Maculopathy laser) OS - 8 Days. Encounter for surgical aftercare following surgery on a sense organ  Z48.810. Excellent post op course   Post operative instructions reviewed - Condition is improving - Plan: Due to Coronavirus COVID-19 pandemic and National Emergency, deferred Slit Lamp examination. Findings are based on diagnostic tests. OCT OD shows a decrease in CMT and OCT OS shows  an increase in edema. Recommend a retina follow - up in 1 mo.

## 2021-12-09 ENCOUNTER — POST-OPERATIVE VISIT (OUTPATIENT)
Dept: URBAN - METROPOLITAN AREA CLINIC 33 | Facility: CLINIC | Age: 66
End: 2021-12-09
Payer: COMMERCIAL

## 2021-12-09 PROCEDURE — 99024 POSTOP FOLLOW-UP VISIT: CPT | Performed by: OPHTHALMOLOGY

## 2021-12-09 ASSESSMENT — INTRAOCULAR PRESSURE
OS: 16
OD: 14

## 2022-01-20 ENCOUNTER — POST-OPERATIVE VISIT (OUTPATIENT)
Dept: URBAN - METROPOLITAN AREA CLINIC 33 | Facility: CLINIC | Age: 67
End: 2022-01-20
Payer: COMMERCIAL

## 2022-01-20 ASSESSMENT — INTRAOCULAR PRESSURE
OS: 14
OD: 14

## 2022-01-20 NOTE — IMPRESSION/PLAN
Impression: S/P MAP OD 9/22/2021, MAP OS 11/03/2021 OS - 78 Days. Encounter for surgical aftercare following surgery on a sense organ  Z48.810. Post operative instructions reviewed - Plan: Due to Coronavirus COVID-19 pandemic and National Emergency, deferred Slit Lamp examination. Findings are based on diagnostic test. OCT OD shows a decrease in edema, OS shows active edema. Discussed and recommend Intravitreal Injection Treatment LEFT EYE with AVASTIN and KENALOG in order to help reduce the swelling and prevent a further reduction in vision. Discussed the risks and benefits of treatment. All questions answered. Patient elects to proceed with recommendation. [id number] NPDR w/edema OS.

## 2022-03-03 ENCOUNTER — OFFICE VISIT (OUTPATIENT)
Dept: URBAN - METROPOLITAN AREA CLINIC 33 | Facility: CLINIC | Age: 67
End: 2022-03-03
Payer: COMMERCIAL

## 2022-03-03 DIAGNOSIS — E11.3491 TYPE 2 DIAB W SEVERE NONPRLF DIAB RTNOP W/O MACULAR EDEMA, RIGHT EYE: ICD-10-CM

## 2022-03-03 PROCEDURE — 92134 CPTRZ OPH DX IMG PST SGM RTA: CPT | Performed by: OPHTHALMOLOGY

## 2022-03-03 PROCEDURE — 99213 OFFICE O/P EST LOW 20 MIN: CPT | Performed by: OPHTHALMOLOGY

## 2022-03-03 ASSESSMENT — INTRAOCULAR PRESSURE
OD: 14
OS: 14

## 2022-03-03 NOTE — IMPRESSION/PLAN
Impression: Type 2 diab w severe nonprlf diab rtnop w/o macular edema, right eye: E11.3491 OD. Plan: Discussed diagnosis in detail with patient. Exam shows minimal Diabetic changes. No treatment is recommended at this time. Emphasized blood sugar control and advised to keep future appointments with PCP and/or Endocrinologist for the management of Diabetes. Recommend observation for now.  OCT shows

## 2022-03-03 NOTE — IMPRESSION/PLAN
Impression: Type 2 diabetes mellitus with severe nonproliferative diabetic retinopathy with macular edema, left eye: L40.112. Plan: Discussed diagnosis in detail with patient. Exam shows minimal Diabetic changes. No treatment is recommended at this time. Emphasized blood sugar control and advised to keep future appointments with PCP and/or Endocrinologist for the management of Diabetes. Recommend observation for now poss. inj next visit.  OCT shows

## 2022-04-14 ENCOUNTER — OFFICE VISIT (OUTPATIENT)
Dept: URBAN - METROPOLITAN AREA CLINIC 33 | Facility: CLINIC | Age: 67
End: 2022-04-14
Payer: COMMERCIAL

## 2022-04-14 DIAGNOSIS — E11.3491 TYPE 2 DIAB W SEVERE NONPRLF DIAB RTNOP W/O MACULAR EDEMA, RIGHT EYE: ICD-10-CM

## 2022-04-14 DIAGNOSIS — E11.3412 TYPE 2 DIABETES MELLITUS WITH SEVERE NONPROLIFERATIVE DIABETIC RETINOPATHY WITH MACULAR EDEMA, LEFT EYE: Primary | ICD-10-CM

## 2022-04-14 PROCEDURE — 67028 INJECTION EYE DRUG: CPT | Performed by: OPHTHALMOLOGY

## 2022-04-14 PROCEDURE — 92134 CPTRZ OPH DX IMG PST SGM RTA: CPT | Performed by: OPHTHALMOLOGY

## 2022-04-14 NOTE — IMPRESSION/PLAN
Impression: Type 2 diab w severe nonprlf diab rtnop w/o macular edema, right eye: E11.3491 OD. Condition: stable. Vision: vision affected. s/p AV OD 07/22/21, AV OD 04/05/21, AV OD 01/07/21. .. s/p MAP OD 09/22/21 Plan: Discussed diagnosis in detail with patient. Exam shows minimal leakage. No treatment is recommended at this time. Emphasized blood sugar control and advised to keep future appointments with PCP and/or Endocrinologist for the management of Diabetes. Recommend observation for now. OCT shows decrease in edema. Will reassess in 4 - 6 wks.

## 2022-04-14 NOTE — IMPRESSION/PLAN
Impression: Type 2 diabetes mellitus with severe nonproliferative diabetic retinopathy with macular edema, left eye: P25.7131. Condition: unstable. Vision: vision affected. s/p AV/DANI OS 01/20/22, AV OS 07/22/21, AV OS 04/05/21. .. s/p MAP OS 11/23/21 Plan: Discussed diagnosis in detail with patient. Exam shows mild edema. Discussed risks of progression. Based on today's exam, diagnostic study and review of records, recommend to continue with Intravitreal Injection Treatment LEFT EYE with AVASTIN  to help reduce the fluid in order to prevent a further reduction in vision. Discussed the risks and benefits of tx. All questions answered. Patient elects to proceed with recommendation. OCT OS shows slight increase in leakage.

## 2022-05-26 ENCOUNTER — OFFICE VISIT (OUTPATIENT)
Dept: URBAN - METROPOLITAN AREA CLINIC 33 | Facility: CLINIC | Age: 67
End: 2022-05-26
Payer: MEDICARE

## 2022-05-26 DIAGNOSIS — E11.3491 TYPE 2 DIAB W SEVERE NONPRLF DIAB RTNOP W/O MACULAR EDEMA, RIGHT EYE: ICD-10-CM

## 2022-05-26 DIAGNOSIS — E11.3412 TYPE 2 DIAB WITH SEVERE NONP RTNOP WITH MACULAR EDEMA, L EYE: Primary | ICD-10-CM

## 2022-05-26 PROCEDURE — 99213 OFFICE O/P EST LOW 20 MIN: CPT | Performed by: OPHTHALMOLOGY

## 2022-05-26 PROCEDURE — 92134 CPTRZ OPH DX IMG PST SGM RTA: CPT | Performed by: OPHTHALMOLOGY

## 2022-05-26 ASSESSMENT — INTRAOCULAR PRESSURE
OD: 14
OS: 14

## 2022-05-26 NOTE — IMPRESSION/PLAN
Impression: Type 2 diab with severe nonp rtnop with macular edema, l eye: I10.3785. Left. Condition: improving. Vision: vision affected. s/p AV OS 04/14/22, AV/DANI OS 01/20/22, AV OS 07/22/21, AV OS 04/05/21. .. s/p MAP OS 11/23/21 Plan: Due to Coronavirus COVID-19 pandemic and National Emergency, deferred Slit Lamp examination. Findings are based on diagnostic tests. OCT shows decreased edema. Based on diagnostic findings recommend observation for now. Will reassess condition in 6 - 8 wks. Emphasized blood sugar control and advised to keep future appointments with PCP and/or Endocrinologist for the management of Diabetes.

## 2022-05-26 NOTE — IMPRESSION/PLAN
Impression: Type 2 diab w severe nonprlf diab rtnop w/o macular edema, right eye: E11.3491 OD. Condition: stable. Vision: vision affected. s/p AV OD 07/22/21, AV OD 04/05/21, AV OD 01/07/21. .. s/p MAP OD 09/22/21 Plan: Due to Coronavirus COVID-19 pandemic and National Emergency, deferred Slit Lamp examination. Findings are based on diagnostic tests. OCT shows decreased leakage. Based on diagnostic findings recommend observation for now. Emphasized blood sugar control and advised to keep future appointments with PCP and/or Endocrinologist for the management of Diabetes.

## 2022-06-06 ENCOUNTER — TESTING ONLY (OUTPATIENT)
Dept: URBAN - METROPOLITAN AREA CLINIC 33 | Facility: CLINIC | Age: 67
End: 2022-06-06
Payer: MEDICARE

## 2022-06-06 DIAGNOSIS — H52.4 PRESBYOPIA: Primary | ICD-10-CM

## 2022-06-06 ASSESSMENT — VISUAL ACUITY
OS: 20/50-
OD: 20/40

## 2022-06-06 NOTE — IMPRESSION/PLAN
Impression: Presbyopia: H52.4. Plan: Issued new glasses RX for best corrected vision. Discussed limitations to new RX due to history of diabetic retinopathy OU.

## 2022-07-21 ENCOUNTER — OFFICE VISIT (OUTPATIENT)
Dept: URBAN - METROPOLITAN AREA CLINIC 33 | Facility: CLINIC | Age: 67
End: 2022-07-21
Payer: COMMERCIAL

## 2022-07-21 DIAGNOSIS — E11.3412 TYPE 2 DIABETES MELLITUS WITH SEVERE NONPROLIFERATIVE DIABETIC RETINOPATHY WITH MACULAR EDEMA, LEFT EYE: ICD-10-CM

## 2022-07-21 DIAGNOSIS — E11.3413 TYPE 2 DIAB WITH SEVERE NONP RTNOP WITH MACULAR EDEMA, BI: Primary | ICD-10-CM

## 2022-07-21 PROCEDURE — 92134 CPTRZ OPH DX IMG PST SGM RTA: CPT | Performed by: OPHTHALMOLOGY

## 2022-07-21 ASSESSMENT — INTRAOCULAR PRESSURE
OS: 12
OD: 12

## 2022-07-21 NOTE — IMPRESSION/PLAN
Impression: Type 2 diab with severe nonp rtnop with macular edema, bi: C62.5130. Bilateral. Condition: stable OD, unstable OS. Vision: vision affected. s/p AV OS 04/14/22, AV/DANI OS 01/20/22, AV OS 07/22/21, AV OS 04/05/21. .. s/p MAP OS 11/23/21

s/p AV OD 07/22/21, AV OD 04/05/21, AV OD 01/07/21. .. s/p MAP OD 09/22/21 Plan: Discussed diagnosis in detail with patient. Exam OS shows increase in leakage. Discussed risks of progression. Based on today's exam, diagnostic studies and review of records, recommend to continue with KENNETH tx LEFT EYE with AVASTIN and KENALOG in order to help reduce the swelling and prevent a further reduction in vision. Discussed the risks and benefits of tx. All questions answered. Patient elects to proceed with recommendation. OCT OS shows minimal increase in swelling. Patient may need additional KENNETH tx or laser treatment in the future. Exam OD shows mild leakage. OCT OD shows minimal swelling. No treatment is required. Recommend observation at this time.

## 2022-09-01 ENCOUNTER — OFFICE VISIT (OUTPATIENT)
Dept: URBAN - METROPOLITAN AREA CLINIC 33 | Facility: CLINIC | Age: 67
End: 2022-09-01
Payer: COMMERCIAL

## 2022-09-01 DIAGNOSIS — E11.3413 TYPE 2 DIABETES MELLITUS WITH SEVERE NONPROLIFERATIVE DIABETIC RETINOPATHY WITH MACULAR EDEMA, BILATERAL: Primary | ICD-10-CM

## 2022-09-01 DIAGNOSIS — E11.3411 TYPE 2 DIABETES MELLITUS WITH SEVERE NONPROLIFERATIVE DIABETIC RETINOPATHY WITH MACULAR EDEMA, RIGHT EYE: ICD-10-CM

## 2022-09-01 PROCEDURE — 67028 INJECTION EYE DRUG: CPT | Performed by: OPHTHALMOLOGY

## 2022-09-01 PROCEDURE — 99213 OFFICE O/P EST LOW 20 MIN: CPT | Performed by: OPHTHALMOLOGY

## 2022-09-01 PROCEDURE — 92134 CPTRZ OPH DX IMG PST SGM RTA: CPT | Performed by: OPHTHALMOLOGY

## 2022-09-01 ASSESSMENT — INTRAOCULAR PRESSURE
OD: 13
OS: 13

## 2022-09-01 NOTE — IMPRESSION/PLAN
Impression: Type 2 diab with severe nonp rtnop with macular edema, bi: L74.9603. Bilateral. Condition: unstable OD, stable OS. Vision: vision affected. s/p AV OS 04/14/22, AV/DANI OS 01/20/22, AV OS 07/22/21, AV OS 04/05/21. .. s/p MAP OS 11/23/21
s/p AV/DANI OS 07/21/22

s/p AV OD 07/22/21, AV OD 04/05/21, AV OD 01/07/21. .. s/p MAP OD 09/22/21 Plan: Discussed diagnosis in detail with patient. Exam, Optos and OCT shows minimal edema OD. Discussed risks of progression. Based on today's exam, diagnostic studies and review of records, recommend to continue with KENNETH tx RIGHT EYE with AVASTIN in order to help reduce the swelling and prevent a further reduction in vision. Discussed the risks and benefits of tx. All questions answered. Patient elects to proceed with recommendation. Patient may need additional KENNETH tx or laser treatment in the future. Exam, Optos and OCT OS shows a decrease in edema, recommend observation for the left eye for now.

## 2022-10-03 ENCOUNTER — OFFICE VISIT (OUTPATIENT)
Dept: URBAN - METROPOLITAN AREA CLINIC 33 | Facility: CLINIC | Age: 67
End: 2022-10-03
Payer: COMMERCIAL

## 2022-10-03 DIAGNOSIS — E11.3413 TYPE 2 DIAB WITH SEVERE NONP RTNOP WITH MACULAR EDEMA, BI: Primary | ICD-10-CM

## 2022-10-03 DIAGNOSIS — E11.3412 TYPE 2 DIABETES MELLITUS WITH SEVERE NONPROLIFERATIVE DIABETIC RETINOPATHY WITH MACULAR EDEMA, LEFT EYE: ICD-10-CM

## 2022-10-03 PROCEDURE — 92134 CPTRZ OPH DX IMG PST SGM RTA: CPT | Performed by: OPHTHALMOLOGY

## 2022-10-03 PROCEDURE — 99213 OFFICE O/P EST LOW 20 MIN: CPT | Performed by: OPHTHALMOLOGY

## 2022-10-03 ASSESSMENT — INTRAOCULAR PRESSURE
OD: 12
OS: 13

## 2022-10-03 NOTE — IMPRESSION/PLAN
Impression: Type 2 diab with severe nonp rtnop with macular edema, bi: S44.2189. Bilateral. Condition: improving OD, unstable OS. Vision: vision affected. s/p AV OS 04/14/22, AV/DANI OS 01/20/22, AV OS 07/22/21, AV OS 04/05/21. .. s/p MAP OS 11/23/21
s/p AV/DANI OS 07/21/22

s/p AV OD 07/22/21, AV OD 04/05/21, AV OD 01/07/21. .. s/p MAP OD 09/22/21 Plan: Discussed diagnosis in detail with patient. Exam, Optos and OCT shows an increase in leakage OS. Discussed risks of progression. Based on today's exam, diagnostic studies and review of records, recommend to continue with KENNETH tx LEFT EYE with AVASTIN and KENALOG in order to help reduce the swelling and prevent a further reduction in vision. Discussed the risks and benefits of tx. All questions answered. Patient elects to proceed with recommendation. Patient may need additional KENNETH tx or laser treatment in the future. Exam, Optos and OCT OD shows a decrease in edema, recommend observation for the right eye for now.

## 2022-11-28 ENCOUNTER — OFFICE VISIT (OUTPATIENT)
Dept: URBAN - METROPOLITAN AREA CLINIC 33 | Facility: CLINIC | Age: 67
End: 2022-11-28
Payer: COMMERCIAL

## 2022-11-28 DIAGNOSIS — H25.13 AGE-RELATED NUCLEAR CATARACT, BILATERAL: ICD-10-CM

## 2022-11-28 PROCEDURE — 99213 OFFICE O/P EST LOW 20 MIN: CPT | Performed by: OPHTHALMOLOGY

## 2022-11-28 PROCEDURE — 92134 CPTRZ OPH DX IMG PST SGM RTA: CPT | Performed by: OPHTHALMOLOGY

## 2022-11-28 ASSESSMENT — INTRAOCULAR PRESSURE
OD: 12
OS: 14

## 2022-11-28 NOTE — IMPRESSION/PLAN
Impression: Age-related nuclear cataract, bilateral: H25.13. Bilateral. Condition: worsening. Vision: vision affected. Plan: Discussed diagnosis in detail with patient. Recommend a Cataract evaluation for consideration of sx.

## 2022-11-28 NOTE — IMPRESSION/PLAN
Impression: Type 2 diab with severe nonp rtnop with macular edema, bi: Y67.9143. Bilateral. Condition: improving OU. Vision: vision affected. s/p AV/DANI OS 10/03/22,  AV OS 04/14/22, AV/DANI OS 01/20/22, AV OS 07/22/21 . .. s/p MAP OS 11/23/21
s/p AV/DANI OS 07/21/22

s/p AV OD 07/22/21, AV OD 04/05/21, AV OD 01/07/21. .. s/p MAP OD 09/22/21 Plan: Discussed diagnosis in detail with patient. Exam shows minimal Diabetic changes OU, improving OS post KENNETH tx with Mellissa Li. No treatment is recommended at this time. Emphasized blood sugar control and advised to keep future appointments with PCP and/or Endocrinologist for the management of Diabetes. Recommend a follow - up in 2 mos. OCT and Optos OD shows mild edema and OCT and Optos OS shows a decrease in edema.

## 2022-12-02 ENCOUNTER — OFFICE VISIT (OUTPATIENT)
Dept: URBAN - METROPOLITAN AREA CLINIC 33 | Facility: CLINIC | Age: 67
End: 2022-12-02
Payer: COMMERCIAL

## 2022-12-02 DIAGNOSIS — H25.813 COMBINED FORMS OF AGE-RELATED CATARACT, BILATERAL: Primary | ICD-10-CM

## 2022-12-02 DIAGNOSIS — E11.3413 TYPE 2 DIAB WITH SEVERE NONP RTNOP WITH MACULAR EDEMA, BI: ICD-10-CM

## 2022-12-02 PROCEDURE — 99214 OFFICE O/P EST MOD 30 MIN: CPT | Performed by: OPHTHALMOLOGY

## 2022-12-02 ASSESSMENT — INTRAOCULAR PRESSURE
OD: 15
OS: 14

## 2022-12-02 ASSESSMENT — VISUAL ACUITY
OS: 20/60
OD: 20/30

## 2022-12-02 NOTE — IMPRESSION/PLAN
Impression: Type 2 diab with severe nonp rtnop with macular edema, bi: J51.0260. Bilateral. Condition: improving OU. Vision: vision affected. s/p AV/DANI OS 10/03/22,  AV OS 04/14/22, AV/DANI OS 01/20/22, AV OS 07/22/21 . .. s/p MAP OS 11/23/21
s/p AV/DANI OS 07/21/22

s/p AV OD 07/22/21, AV OD 04/05/21, AV OD 01/07/21. .. s/p MAP OD 09/22/21 Plan: F/U with retina as scheduled

## 2022-12-02 NOTE — IMPRESSION/PLAN
Impression: Combined forms of age-related cataract, bilateral: H25.813. Condition: quality of life issue. Symptoms: could improve with surgery. Vision: vision affected. Plan: Cataracts account for the patient's complaints. Discussed all risks, benefits, procedures and recovery. Patient has quality of life issues. Patient understands changing glasses will not improve vision. Patient desires to have surgery, recommend phacoemulsification with intraocular lens. CE w/IOL OS then OD. R/B/A discussed. RL2. Lens: standard, no advanced technology   Aim: reynaldoo. Nikkie Henderson. Pt will use Ketorolac post operatively.

## 2022-12-21 ENCOUNTER — TESTING ONLY (OUTPATIENT)
Dept: URBAN - METROPOLITAN AREA CLINIC 33 | Facility: CLINIC | Age: 67
End: 2022-12-21
Payer: COMMERCIAL

## 2022-12-21 DIAGNOSIS — H25.813 COMBINED FORMS OF AGE-RELATED CATARACT, BILATERAL: Primary | ICD-10-CM

## 2022-12-21 ASSESSMENT — PACHYMETRY
OD: 23.36
OD: 3.26
OS: 3.29
OS: 23.28

## 2023-01-16 ENCOUNTER — SURGERY (OUTPATIENT)
Dept: URBAN - METROPOLITAN AREA SURGERY 15 | Facility: SURGERY | Age: 68
End: 2023-01-16
Payer: COMMERCIAL

## 2023-01-16 PROCEDURE — 66984 XCAPSL CTRC RMVL W/O ECP: CPT | Performed by: OPHTHALMOLOGY

## 2023-01-17 ENCOUNTER — POST-OPERATIVE VISIT (OUTPATIENT)
Dept: URBAN - METROPOLITAN AREA CLINIC 33 | Facility: CLINIC | Age: 68
End: 2023-01-17
Payer: COMMERCIAL

## 2023-01-17 DIAGNOSIS — Z48.810 ENCOUNTER FOR SURGICAL AFTERCARE FOLLOWING SURGERY ON A SENSE ORGAN: Primary | ICD-10-CM

## 2023-01-17 PROCEDURE — 99024 POSTOP FOLLOW-UP VISIT: CPT | Performed by: OPTOMETRIST

## 2023-01-17 ASSESSMENT — INTRAOCULAR PRESSURE
OS: 18
OD: 16

## 2023-01-17 NOTE — IMPRESSION/PLAN
Impression: S/P Cataract Extraction by phacoemulsification with IOL placement; Dextenza OS - 1 Day. Encounter for surgical aftercare following surgery on a sense organ  Z48.810. Post operative instructions reviewed - Condition is improving. Dilate OS at PO2. Plan: --Advised patient to use artificial tears for comfort.

## 2023-01-24 ENCOUNTER — POST-OPERATIVE VISIT (OUTPATIENT)
Dept: URBAN - METROPOLITAN AREA CLINIC 33 | Facility: CLINIC | Age: 68
End: 2023-01-24
Payer: COMMERCIAL

## 2023-01-24 DIAGNOSIS — Z48.810 ENCOUNTER FOR SURGICAL AFTERCARE FOLLOWING SURGERY ON A SENSE ORGAN: ICD-10-CM

## 2023-01-24 DIAGNOSIS — H52.4 PRESBYOPIA: Primary | ICD-10-CM

## 2023-01-24 PROCEDURE — 99024 POSTOP FOLLOW-UP VISIT: CPT | Performed by: OPTOMETRIST

## 2023-01-24 ASSESSMENT — VISUAL ACUITY
OS: 20/60
OD: 20/30

## 2023-01-24 ASSESSMENT — INTRAOCULAR PRESSURE
OD: 15
OS: 15

## 2023-01-24 NOTE — IMPRESSION/PLAN
Impression: S/P Cataract Extraction by phacoemulsification with IOL placement; Dextenza OS - 8 Days. Encounter for surgical aftercare following surgery on a sense organ  Z48.810. Condition is improving - Plan: Keep CE OD Schedule CFM w/ Dr. Zulema Arias 2-4 weeks

## 2023-02-02 ENCOUNTER — OFFICE VISIT (OUTPATIENT)
Dept: URBAN - METROPOLITAN AREA CLINIC 33 | Facility: CLINIC | Age: 68
End: 2023-02-02
Payer: COMMERCIAL

## 2023-02-02 DIAGNOSIS — E11.3413 TYPE 2 DIAB WITH SEVERE NONP RTNOP WITH MACULAR EDEMA, BI: ICD-10-CM

## 2023-02-02 DIAGNOSIS — H59.032 CYSTOID MACULAR EDEMA FOLLOWING CATARACT SURGERY, LEFT EYE: Primary | ICD-10-CM

## 2023-02-02 PROCEDURE — 99213 OFFICE O/P EST LOW 20 MIN: CPT | Performed by: OPHTHALMOLOGY

## 2023-02-02 PROCEDURE — 92134 CPTRZ OPH DX IMG PST SGM RTA: CPT | Performed by: OPHTHALMOLOGY

## 2023-02-02 RX ORDER — PREDNISOLONE ACETATE 10 MG/ML
1 % SUSPENSION/ DROPS OPHTHALMIC
Qty: 10 | Refills: 5 | Status: ACTIVE
Start: 2023-02-02

## 2023-02-02 RX ORDER — KETOROLAC TROMETHAMINE 5 MG/ML
0.5 % SOLUTION OPHTHALMIC
Qty: 10 | Refills: 5 | Status: ACTIVE
Start: 2023-02-02

## 2023-02-02 ASSESSMENT — INTRAOCULAR PRESSURE
OS: 14
OD: 12

## 2023-02-02 NOTE — IMPRESSION/PLAN
Impression: Cystoid macular edema following cataract surgery, left eye: H59.032. Left. Condition: unstable. Vision: vision affected. s/p CEIOL OS 01/16/23 w/ Dr. Beryl Francis Plan: Deferred Slit Lamp examination. Findings are based on diagnostic tests. OCT and Optos OS shows increased edema. Recommend starting Prednisolone and Ketorolac QID OS to help reduce the swelling (e'rxed w/ refills). If no improvement with treatment, will consider Avastin/Kenalog injection. Will reassess the retina in 1 month.

## 2023-02-02 NOTE — IMPRESSION/PLAN
Impression: Type 2 diab with severe nonp rtnop with macular edema, bi: B81.1455. Bilateral. Condition: stable OD, unstable OS. Vision: vision affected. s/p AV/DANI OS 10/03/22, AV/DANI OS 07/21/22, AV OS 04/14/22, AV/DANI OS 01/20/22. .. s/p AV OD 09/01/22, AV OD 07/22/21, AV OD 04/05/21, AV OD 01/07/21. .. s/p MAP OS 11/23/21, MAP OD 09/22/21 Plan: Deferred Slit Lamp examination. Findings are based on diagnostic tests. OCT and Optos OD shows decreased edema. OCT and Optos OS shows increased edema. Based on diagnostic findings recommend observation for OD. Emphasized blood sugar control and advised to keep future appointments with PCP and/or Endocrinologist for the management of Diabetes. Will start Prednisolone and Ketorolac QID OS - see notes above.

## 2023-02-20 ENCOUNTER — OFFICE VISIT (OUTPATIENT)
Dept: URBAN - METROPOLITAN AREA CLINIC 33 | Facility: CLINIC | Age: 68
End: 2023-02-20
Payer: COMMERCIAL

## 2023-02-20 DIAGNOSIS — E11.3413 TYPE 2 DIABETES MELLITUS WITH SEVERE NONPROLIFERATIVE DIABETIC RETINOPATHY WITH MACULAR EDEMA, BILATERAL: Primary | ICD-10-CM

## 2023-02-20 PROCEDURE — 92134 CPTRZ OPH DX IMG PST SGM RTA: CPT | Performed by: OPHTHALMOLOGY

## 2023-02-20 PROCEDURE — 99213 OFFICE O/P EST LOW 20 MIN: CPT | Performed by: OPHTHALMOLOGY

## 2023-02-20 ASSESSMENT — INTRAOCULAR PRESSURE
OD: 14
OS: 15

## 2023-02-20 NOTE — IMPRESSION/PLAN
Impression: Type 2 diab with severe nonp rtnop with macular edema, bi: N09.3941. Bilateral. Condition: unstable OU. Vision: vision affected. s/p AV/DANI OS 10/03/22, AV/DANI OS 07/21/22, AV OS 04/14/22, AV/DANI OS 01/20/22. .. s/p AV OD 09/01/22, AV OD 07/22/21, AV OD 04/05/21, AV OD 01/07/21. .. s/p MAP OS 11/23/21, MAP OD 09/22/21 Plan: Fundus and SLE deferred today, findings are based on diagnostic testing only. Discussed diagnosis in detail with patient. OCT OD shows a mild increase in edema and OCT OS shows a moderate increase in Edema. Discussed risks of progression. Based on diagnostic studies and review of records, recommend to continue with KENNETH tx Left EYE with AVASTIN and KENALOG; and AVASTIN only in the RIGHT EYE in order to help reduce the swelling and prevent a further reduction in vision. Discussed the risks and benefits of tx. All questions answered. Patient elects to proceed with recommendation. Patient may need additional KENNETH tx or laser treatment in the future. Continue all post op cataract drops as drops as directed by Dr Gianni Magdaleno.

## 2023-03-30 ENCOUNTER — OFFICE VISIT (OUTPATIENT)
Dept: URBAN - METROPOLITAN AREA CLINIC 33 | Facility: CLINIC | Age: 68
End: 2023-03-30
Payer: COMMERCIAL

## 2023-03-30 DIAGNOSIS — E11.3413 TYPE 2 DIABETES MELLITUS WITH SEVERE NONPROLIFERATIVE DIABETIC RETINOPATHY WITH MACULAR EDEMA, BILATERAL: ICD-10-CM

## 2023-03-30 DIAGNOSIS — E11.3213 TYPE 2 DIAB W MILD NONPRLF DIABETIC RTNOP W MACULAR EDEMA, BILATERAL: Primary | ICD-10-CM

## 2023-03-30 PROCEDURE — 99213 OFFICE O/P EST LOW 20 MIN: CPT | Performed by: OPHTHALMOLOGY

## 2023-03-30 PROCEDURE — 92134 CPTRZ OPH DX IMG PST SGM RTA: CPT | Performed by: OPHTHALMOLOGY

## 2023-03-30 ASSESSMENT — INTRAOCULAR PRESSURE
OD: 15
OS: 16

## 2023-03-30 NOTE — IMPRESSION/PLAN
Impression: Type 2 diab w mild nonprlf diabetic rtnop w macular edema, bilateral: L58.4312. Bilateral. Condition: improving. Vision: vision improved. s/p AV OD 2/20/23, AV OD 09/01/22, AV OD 07/22/21, AV OD 04/05/21, AV OD 01/07/21. .. s/p AV/ DANI OS 2/20/23, AV/DANI OS 10/03/22, AV/DANI OS 07/21/22, AV OS 04/14/22 . .. s/p MAP OS 11/23/21, MAP OD 09/22/21 Plan: Discussed diagnosis in detail with patient. Exam shows a decrease in edema post KENNETH tx with AV OD and AV/DANI OS. OCT and Optos OD shows a decrease  in  edema and OCT and Optos OS shows a marked decrease in edema. Discussed Steroid Implants in detail with the patient, may consider in the future. since patient tolerates steroid medication well and IOP OS is stable. At this time recommend observation. Will reassess the retina in 4 - 6 wks. Emphasized blood sugar control and advised to keep future appointments with PCP and/or Endocrinologist for the management of Diabetes.

## 2023-05-11 ENCOUNTER — OFFICE VISIT (OUTPATIENT)
Dept: URBAN - METROPOLITAN AREA CLINIC 33 | Facility: CLINIC | Age: 68
End: 2023-05-11
Payer: COMMERCIAL

## 2023-05-11 DIAGNOSIS — E11.3213 TYPE 2 DIAB W MILD NONPRLF DIABETIC RTNOP W MACULAR EDEMA, BILATERAL: Primary | ICD-10-CM

## 2023-05-11 PROCEDURE — 99213 OFFICE O/P EST LOW 20 MIN: CPT | Performed by: OPHTHALMOLOGY

## 2023-05-11 PROCEDURE — 92134 CPTRZ OPH DX IMG PST SGM RTA: CPT | Performed by: OPHTHALMOLOGY

## 2023-05-11 ASSESSMENT — INTRAOCULAR PRESSURE
OD: 15
OS: 15

## 2023-05-11 NOTE — IMPRESSION/PLAN
Impression: Type 2 diab w mild nonprlf diabetic rtnop w macular edema, bilateral: X32.1825. Bilateral. Condition: improving. Vision: vision improved. s/p AV OD 2/20/23, AV OD 09/01/22, AV OD 07/22/21, AV OD 04/05/21, AV OD 01/07/21. .. s/p AV/ DANI OS 2/20/23, AV/DANI OS 10/03/22, AV/DANI OS 07/21/22, AV OS 04/14/22 . .. s/p MAP OS 11/23/21, MAP OD 09/22/21 Plan: Discussed diagnosis in detail with patient. Exam OU shows decreasing edema - improving. OCT OD shows decreased CMT and OCT OS shows minimal edema. Optos OU shows decreasing edema. Discussed Steroid Implants in detail with the patient, may consider in the future. since patient tolerates steroid medication well and IOP OS is stable. At this time recommend observation. Will reassess the retina in 6 wks. Emphasized blood sugar control and advised to keep future appointments with PCP and/or Endocrinologist for the management of Diabetes.

## 2023-06-22 ENCOUNTER — OFFICE VISIT (OUTPATIENT)
Dept: URBAN - METROPOLITAN AREA CLINIC 33 | Facility: CLINIC | Age: 68
End: 2023-06-22
Payer: COMMERCIAL

## 2023-06-22 DIAGNOSIS — E11.3213 TYPE 2 DIAB W MILD NONPRLF DIABETIC RTNOP W MACULAR EDEMA, BILATERAL: Primary | ICD-10-CM

## 2023-06-22 PROCEDURE — 92250 FUNDUS PHOTOGRAPHY W/I&R: CPT | Performed by: OPHTHALMOLOGY

## 2023-06-22 PROCEDURE — 99213 OFFICE O/P EST LOW 20 MIN: CPT | Performed by: OPHTHALMOLOGY

## 2023-06-22 PROCEDURE — 92134 CPTRZ OPH DX IMG PST SGM RTA: CPT | Performed by: OPHTHALMOLOGY

## 2023-06-22 ASSESSMENT — INTRAOCULAR PRESSURE
OD: 15
OS: 13

## 2023-06-22 NOTE — IMPRESSION/PLAN
Impression: Type 2 diab w mild nonprlf diabetic rtnop w macular edema, bilateral: G30.2469. Bilateral. Condition: stable OD, unstable OS. Vision: vision stable OD, worsening OS. s/p AV OD 02/20/23, AV OD 09/01/22, AV OD 07/22/21, AV OD 04/05/21, AV OD 01/07/21. .. s/p AV/DANI OS 02/20/23, AV/DANI OS 10/03/22, AV/DANI OS 07/21/22, AV OS 04/14/22. .. s/p MAP OS 11/23/21, MAP OD 09/22/21 Plan: Discussed diagnosis in detail with patient. Discussed risks of progression. Based on today's exam, diagnostic studies and review of records, recommend to continue with KENNETH tx LEFT EYE with AVASTIN and KENALOG in order to help reduce the swelling and prevent a further reduction in vision. Discussed the risks and benefits of tx. All questions answered. Patient elects to proceed with recommendation. OCT and Optos OS shows increased active leakage. Patient may need additional KENNETH tx or laser treatment in the future. Exam OD shows minimal exudates. OCT and Optos OD shows stable. No further treatment is required at this time. Will continue to monitor and observe.

## 2023-06-26 ENCOUNTER — PROCEDURE (OUTPATIENT)
Dept: URBAN - METROPOLITAN AREA CLINIC 33 | Facility: CLINIC | Age: 68
End: 2023-06-26
Payer: COMMERCIAL

## 2023-06-26 DIAGNOSIS — E11.3212 TYPE 2 DIAB WITH MILD NONP RTNOP WITH MACULAR EDEMA, L EYE: Primary | ICD-10-CM

## 2023-07-24 ENCOUNTER — OFFICE VISIT (OUTPATIENT)
Dept: URBAN - METROPOLITAN AREA CLINIC 33 | Facility: CLINIC | Age: 68
End: 2023-07-24
Payer: COMMERCIAL

## 2023-07-24 DIAGNOSIS — E11.3213 TYPE 2 DIAB W MILD NONPRLF DIABETIC RTNOP W MACULAR EDEMA, BILATERAL: Primary | ICD-10-CM

## 2023-07-24 PROCEDURE — 92134 CPTRZ OPH DX IMG PST SGM RTA: CPT | Performed by: OPHTHALMOLOGY

## 2023-07-24 PROCEDURE — 99213 OFFICE O/P EST LOW 20 MIN: CPT | Performed by: OPHTHALMOLOGY

## 2023-07-24 ASSESSMENT — INTRAOCULAR PRESSURE
OD: 14
OS: 13

## 2023-08-28 ENCOUNTER — OFFICE VISIT (OUTPATIENT)
Dept: URBAN - METROPOLITAN AREA CLINIC 33 | Facility: CLINIC | Age: 68
End: 2023-08-28
Payer: COMMERCIAL

## 2023-08-28 DIAGNOSIS — E11.3213 TYPE 2 DIABETES MELLITUS WITH MILD NONPROLIFERATIVE DIABETIC RETINOPATHY WITH MACULAR EDEMA, BILATERAL: Primary | ICD-10-CM

## 2023-08-28 DIAGNOSIS — E11.3212 TYPE 2 DIABETES MELLITUS WITH MILD NONPROLIFERATIVE DIABETIC RETINOPATHY WITH MACULAR EDEMA, LEFT EYE: ICD-10-CM

## 2023-08-28 PROCEDURE — 92134 CPTRZ OPH DX IMG PST SGM RTA: CPT | Performed by: OPHTHALMOLOGY

## 2023-08-28 ASSESSMENT — INTRAOCULAR PRESSURE
OS: 13
OD: 16

## 2023-09-11 ENCOUNTER — TESTING ONLY (OUTPATIENT)
Dept: URBAN - METROPOLITAN AREA CLINIC 33 | Facility: CLINIC | Age: 68
End: 2023-09-11
Payer: COMMERCIAL

## 2023-09-11 DIAGNOSIS — H52.4 PRESBYOPIA: Primary | ICD-10-CM

## 2023-09-11 ASSESSMENT — VISUAL ACUITY
OD: 20/40
OS: 20/40

## 2023-10-23 ENCOUNTER — OFFICE VISIT (OUTPATIENT)
Dept: URBAN - METROPOLITAN AREA CLINIC 33 | Facility: CLINIC | Age: 68
End: 2023-10-23
Payer: COMMERCIAL

## 2023-10-23 DIAGNOSIS — E11.3213 TYPE 2 DIAB W MILD NONPRLF DIABETIC RTNOP W MACULAR EDEMA, BILATERAL: Primary | ICD-10-CM

## 2023-10-23 PROCEDURE — 92134 CPTRZ OPH DX IMG PST SGM RTA: CPT | Performed by: OPHTHALMOLOGY

## 2023-10-23 PROCEDURE — 99213 OFFICE O/P EST LOW 20 MIN: CPT | Performed by: OPHTHALMOLOGY

## 2023-10-23 RX ORDER — INSULIN GLARGINE 100 [IU]/ML
INJECTION, SOLUTION SUBCUTANEOUS
Qty: 0 | Refills: 0 | Status: ACTIVE
Start: 2023-10-23

## 2023-10-23 ASSESSMENT — INTRAOCULAR PRESSURE
OD: 12
OS: 14

## 2023-11-30 ENCOUNTER — OFFICE VISIT (OUTPATIENT)
Dept: URBAN - METROPOLITAN AREA CLINIC 23 | Facility: CLINIC | Age: 68
End: 2023-11-30
Payer: COMMERCIAL

## 2023-11-30 DIAGNOSIS — E11.3212 TYPE 2 DIABETES MELLITUS WITH MILD NONPROLIFERATIVE DIABETIC RETINOPATHY WITH MACULAR EDEMA, LEFT EYE: Primary | ICD-10-CM

## 2023-11-30 PROCEDURE — 92134 CPTRZ OPH DX IMG PST SGM RTA: CPT | Performed by: OPHTHALMOLOGY

## 2023-11-30 PROCEDURE — 99213 OFFICE O/P EST LOW 20 MIN: CPT | Performed by: OPHTHALMOLOGY

## 2023-11-30 ASSESSMENT — INTRAOCULAR PRESSURE
OD: 12
OS: 11

## 2023-12-01 ENCOUNTER — OFFICE VISIT (OUTPATIENT)
Dept: URBAN - METROPOLITAN AREA CLINIC 23 | Facility: CLINIC | Age: 68
End: 2023-12-01
Payer: COMMERCIAL

## 2023-12-05 ENCOUNTER — OFFICE VISIT (OUTPATIENT)
Dept: URBAN - METROPOLITAN AREA CLINIC 32 | Facility: CLINIC | Age: 68
End: 2023-12-05
Payer: COMMERCIAL

## 2023-12-05 DIAGNOSIS — H25.811 COMBINED FORMS OF AGE-RELATED CATARACT, RIGHT EYE: Primary | ICD-10-CM

## 2023-12-05 PROCEDURE — 99214 OFFICE O/P EST MOD 30 MIN: CPT | Performed by: OPHTHALMOLOGY

## 2023-12-05 ASSESSMENT — KERATOMETRY
OS: 45.00
OD: 45.13

## 2023-12-05 ASSESSMENT — VISUAL ACUITY
OD: 20/40
OS: 20/50

## 2023-12-15 ENCOUNTER — TECH ONLY (OUTPATIENT)
Dept: URBAN - METROPOLITAN AREA CLINIC 33 | Facility: CLINIC | Age: 68
End: 2023-12-15
Payer: COMMERCIAL

## 2023-12-15 PROCEDURE — 76519 ECHO EXAM OF EYE: CPT | Performed by: OPHTHALMOLOGY

## 2023-12-15 ASSESSMENT — PACHYMETRY
OD: 23.32
OD: 3.26
OS: 3.26
OS: 23.23

## 2023-12-18 ENCOUNTER — SURGERY (OUTPATIENT)
Dept: URBAN - METROPOLITAN AREA SURGERY 15 | Facility: SURGERY | Age: 68
End: 2023-12-18
Payer: COMMERCIAL

## 2023-12-18 PROCEDURE — 66984 XCAPSL CTRC RMVL W/O ECP: CPT | Performed by: OPHTHALMOLOGY

## 2023-12-19 ENCOUNTER — POST-OPERATIVE VISIT (OUTPATIENT)
Dept: URBAN - METROPOLITAN AREA CLINIC 33 | Facility: CLINIC | Age: 68
End: 2023-12-19
Payer: COMMERCIAL

## 2023-12-19 DIAGNOSIS — Z96.1 PRESENCE OF INTRAOCULAR LENS: Primary | ICD-10-CM

## 2023-12-19 PROCEDURE — 99024 POSTOP FOLLOW-UP VISIT: CPT

## 2023-12-19 ASSESSMENT — INTRAOCULAR PRESSURE
OD: 18
OS: 14

## 2023-12-26 ENCOUNTER — POST-OPERATIVE VISIT (OUTPATIENT)
Dept: URBAN - METROPOLITAN AREA CLINIC 33 | Facility: CLINIC | Age: 68
End: 2023-12-26
Payer: COMMERCIAL

## 2023-12-26 DIAGNOSIS — Z48.810 ENCOUNTER FOR SURGICAL AFTERCARE FOLLOWING SURGERY ON A SENSE ORGAN: ICD-10-CM

## 2023-12-26 DIAGNOSIS — H52.4 PRESBYOPIA: Primary | ICD-10-CM

## 2023-12-26 PROCEDURE — 99024 POSTOP FOLLOW-UP VISIT: CPT

## 2023-12-26 ASSESSMENT — INTRAOCULAR PRESSURE
OS: 14
OD: 14

## 2023-12-26 ASSESSMENT — VISUAL ACUITY: OD: 20/30

## 2024-01-12 ENCOUNTER — OFFICE VISIT (OUTPATIENT)
Dept: URBAN - METROPOLITAN AREA CLINIC 23 | Facility: CLINIC | Age: 69
End: 2024-01-12
Payer: COMMERCIAL

## 2024-01-12 DIAGNOSIS — E11.3211 TYPE 2 DIAB WITH MILD NONP RTNOP WITH MACULAR EDEMA, R EYE: ICD-10-CM

## 2024-01-12 DIAGNOSIS — E11.3213 TYPE 2 DIAB W MILD NONPRLF DIABETIC RTNOP W MACULAR EDEMA, BILATERAL: Primary | ICD-10-CM

## 2024-01-12 PROCEDURE — 92134 CPTRZ OPH DX IMG PST SGM RTA: CPT | Performed by: OPHTHALMOLOGY

## 2024-01-12 PROCEDURE — 67028 INJECTION EYE DRUG: CPT | Performed by: OPHTHALMOLOGY

## 2024-01-12 PROCEDURE — 99213 OFFICE O/P EST LOW 20 MIN: CPT | Performed by: OPHTHALMOLOGY

## 2024-01-12 ASSESSMENT — INTRAOCULAR PRESSURE
OS: 18
OD: 16

## 2024-05-06 ENCOUNTER — OFFICE VISIT (OUTPATIENT)
Dept: URBAN - METROPOLITAN AREA CLINIC 33 | Facility: CLINIC | Age: 69
End: 2024-05-06
Payer: COMMERCIAL

## 2024-05-06 DIAGNOSIS — E11.3212 TYPE 2 DIABETES MELLITUS WITH MILD NONPROLIFERATIVE DIABETIC RETINOPATHY WITH MACULAR EDEMA, LEFT EYE: ICD-10-CM

## 2024-05-06 DIAGNOSIS — E11.3213 TYPE 2 DIAB W MILD NONPRLF DIABETIC RTNOP W MACULAR EDEMA, BILATERAL: Primary | ICD-10-CM

## 2024-05-06 PROCEDURE — 92134 CPTRZ OPH DX IMG PST SGM RTA: CPT | Performed by: OPHTHALMOLOGY

## 2024-05-06 PROCEDURE — 67028 INJECTION EYE DRUG: CPT | Performed by: OPHTHALMOLOGY

## 2024-05-06 ASSESSMENT — INTRAOCULAR PRESSURE
OS: 17
OD: 15

## 2024-07-01 ENCOUNTER — OFFICE VISIT (OUTPATIENT)
Dept: URBAN - METROPOLITAN AREA CLINIC 33 | Facility: CLINIC | Age: 69
End: 2024-07-01
Payer: COMMERCIAL

## 2024-07-01 DIAGNOSIS — E11.3213 TYPE 2 DIAB W MILD NONPRLF DIABETIC RTNOP W MACULAR EDEMA, BILATERAL: Primary | ICD-10-CM

## 2024-07-01 PROCEDURE — 92134 CPTRZ OPH DX IMG PST SGM RTA: CPT | Performed by: OPHTHALMOLOGY

## 2024-07-01 PROCEDURE — 99213 OFFICE O/P EST LOW 20 MIN: CPT | Performed by: OPHTHALMOLOGY

## 2024-07-01 ASSESSMENT — INTRAOCULAR PRESSURE
OS: 10
OD: 10

## 2024-08-15 ENCOUNTER — OFFICE VISIT (OUTPATIENT)
Dept: URBAN - METROPOLITAN AREA CLINIC 33 | Facility: CLINIC | Age: 69
End: 2024-08-15
Payer: COMMERCIAL

## 2024-08-15 DIAGNOSIS — E11.3213 TYPE 2 DIAB W MILD NONPRLF DIABETIC RTNOP W MACULAR EDEMA, BILATERAL: Primary | ICD-10-CM

## 2024-08-15 PROCEDURE — 92134 CPTRZ OPH DX IMG PST SGM RTA: CPT | Performed by: OPHTHALMOLOGY

## 2024-08-15 PROCEDURE — 99213 OFFICE O/P EST LOW 20 MIN: CPT | Performed by: OPHTHALMOLOGY

## 2024-08-15 ASSESSMENT — INTRAOCULAR PRESSURE
OD: 13
OS: 15

## 2024-09-23 ENCOUNTER — OFFICE VISIT (OUTPATIENT)
Dept: URBAN - METROPOLITAN AREA CLINIC 33 | Facility: CLINIC | Age: 69
End: 2024-09-23
Payer: COMMERCIAL

## 2024-09-23 DIAGNOSIS — E11.3212 TYPE 2 DIABETES MELLITUS WITH MILD NONPROLIFERATIVE DIABETIC RETINOPATHY WITH MACULAR EDEMA, LEFT EYE: ICD-10-CM

## 2024-09-23 DIAGNOSIS — E11.3213 TYPE 2 DIAB W MILD NONPRLF DIABETIC RTNOP W MACULAR EDEMA, BILATERAL: Primary | ICD-10-CM

## 2024-09-23 PROCEDURE — 92134 CPTRZ OPH DX IMG PST SGM RTA: CPT | Performed by: OPHTHALMOLOGY

## 2024-09-23 PROCEDURE — 67028 INJECTION EYE DRUG: CPT | Performed by: OPHTHALMOLOGY

## 2024-09-23 ASSESSMENT — INTRAOCULAR PRESSURE
OS: 16
OD: 15

## 2024-12-05 ENCOUNTER — OFFICE VISIT (OUTPATIENT)
Dept: URBAN - METROPOLITAN AREA CLINIC 33 | Facility: CLINIC | Age: 69
End: 2024-12-05
Payer: COMMERCIAL

## 2024-12-05 DIAGNOSIS — E11.3213 TYPE 2 DIAB W MILD NONPRLF DIABETIC RTNOP W MACULAR EDEMA, BILATERAL: Primary | ICD-10-CM

## 2024-12-05 PROCEDURE — 99213 OFFICE O/P EST LOW 20 MIN: CPT | Performed by: OPHTHALMOLOGY

## 2024-12-05 PROCEDURE — 92134 CPTRZ OPH DX IMG PST SGM RTA: CPT | Performed by: OPHTHALMOLOGY

## 2024-12-05 ASSESSMENT — INTRAOCULAR PRESSURE
OD: 10
OS: 15

## 2025-01-30 ENCOUNTER — OFFICE VISIT (OUTPATIENT)
Dept: URBAN - METROPOLITAN AREA CLINIC 33 | Facility: CLINIC | Age: 70
End: 2025-01-30
Payer: COMMERCIAL

## 2025-01-30 DIAGNOSIS — E11.3213 TYPE 2 DIAB W MILD NONPRLF DIABETIC RTNOP W MACULAR EDEMA, BILATERAL: Primary | ICD-10-CM

## 2025-01-30 PROCEDURE — 92134 CPTRZ OPH DX IMG PST SGM RTA: CPT | Performed by: OPHTHALMOLOGY

## 2025-01-30 PROCEDURE — 99213 OFFICE O/P EST LOW 20 MIN: CPT | Performed by: OPHTHALMOLOGY

## 2025-02-10 ENCOUNTER — OFFICE VISIT (OUTPATIENT)
Dept: URBAN - METROPOLITAN AREA CLINIC 33 | Facility: CLINIC | Age: 70
End: 2025-02-10
Payer: COMMERCIAL

## 2025-02-10 DIAGNOSIS — E11.3212 TYPE 2 DIABETES MELLITUS WITH MILD NONPROLIFERATIVE DIABETIC RETINOPATHY WITH MACULAR EDEMA, LEFT EYE: Primary | ICD-10-CM

## 2025-02-10 PROCEDURE — 67028 INJECTION EYE DRUG: CPT | Performed by: OPHTHALMOLOGY

## 2025-03-24 ENCOUNTER — OFFICE VISIT (OUTPATIENT)
Dept: URBAN - METROPOLITAN AREA CLINIC 33 | Facility: CLINIC | Age: 70
End: 2025-03-24
Payer: COMMERCIAL

## 2025-03-24 DIAGNOSIS — E11.3213 TYPE 2 DIAB W MILD NONPRLF DIABETIC RTNOP W MACULAR EDEMA, BILATERAL: Primary | ICD-10-CM

## 2025-03-24 PROCEDURE — 92134 CPTRZ OPH DX IMG PST SGM RTA: CPT | Performed by: OPHTHALMOLOGY

## 2025-03-24 PROCEDURE — 99213 OFFICE O/P EST LOW 20 MIN: CPT | Performed by: OPHTHALMOLOGY

## 2025-03-24 ASSESSMENT — INTRAOCULAR PRESSURE
OD: 14
OS: 15

## 2025-04-23 ENCOUNTER — OFFICE VISIT (OUTPATIENT)
Dept: URBAN - METROPOLITAN AREA CLINIC 33 | Facility: CLINIC | Age: 70
End: 2025-04-23
Payer: COMMERCIAL

## 2025-04-23 DIAGNOSIS — E11.3213 TYPE 2 DIABETES MELLITUS WITH MILD NONPROLIFERATIVE DIABETIC RETINOPATHY WITH MACULAR EDEMA, BILATERAL: Primary | ICD-10-CM

## 2025-04-23 PROCEDURE — 92134 CPTRZ OPH DX IMG PST SGM RTA: CPT

## 2025-04-23 PROCEDURE — 99213 OFFICE O/P EST LOW 20 MIN: CPT

## 2025-04-23 ASSESSMENT — INTRAOCULAR PRESSURE
OS: 14
OD: 13

## 2025-06-05 ENCOUNTER — OFFICE VISIT (OUTPATIENT)
Dept: URBAN - METROPOLITAN AREA CLINIC 33 | Facility: CLINIC | Age: 70
End: 2025-06-05
Payer: COMMERCIAL

## 2025-06-05 DIAGNOSIS — E11.3213 TYPE 2 DIAB W MILD NONPRLF DIABETIC RTNOP W MACULAR EDEMA, BILATERAL: Primary | ICD-10-CM

## 2025-06-05 PROCEDURE — 99213 OFFICE O/P EST LOW 20 MIN: CPT | Performed by: OPHTHALMOLOGY

## 2025-06-05 PROCEDURE — 92134 CPTRZ OPH DX IMG PST SGM RTA: CPT | Performed by: OPHTHALMOLOGY

## 2025-06-05 ASSESSMENT — INTRAOCULAR PRESSURE
OD: 10
OS: 15